# Patient Record
Sex: FEMALE | Race: WHITE | NOT HISPANIC OR LATINO | Employment: FULL TIME | URBAN - METROPOLITAN AREA
[De-identification: names, ages, dates, MRNs, and addresses within clinical notes are randomized per-mention and may not be internally consistent; named-entity substitution may affect disease eponyms.]

---

## 2017-01-13 ENCOUNTER — TELEPHONE (OUTPATIENT)
Dept: NEUROLOGY | Facility: HOSPITAL | Age: 57
End: 2017-01-13

## 2017-01-13 NOTE — TELEPHONE ENCOUNTER
----- Message from Tameka Case sent at 1/12/2017  4:26 PM CST -----  Contact: [phone]  GI- Patient is calling to see if she can get blood work orders to check her Hep C viral load. Please call back to assist.

## 2017-01-13 NOTE — TELEPHONE ENCOUNTER
LVM for pt to inform her that forward information to Dr. Nelson for next step. Awaiting return call back.

## 2017-01-13 NOTE — TELEPHONE ENCOUNTER
Spoke with pt and informed her that her message was forward to Dr. Nelson for review. Pt verbalizes understanding and stated that we leave the message on her machine that the orders are put in so she doesn't have to call back. No other concerns noted.

## 2017-01-17 ENCOUNTER — TELEPHONE (OUTPATIENT)
Dept: NEUROLOGY | Facility: HOSPITAL | Age: 57
End: 2017-01-17

## 2017-01-17 NOTE — TELEPHONE ENCOUNTER
LVM for pt to inform her per Dr. Nelson that pt Hep. C is cured and that there is no need to check it. Awaiting return call back.

## 2017-01-19 ENCOUNTER — TELEPHONE (OUTPATIENT)
Dept: NEUROLOGY | Facility: HOSPITAL | Age: 57
End: 2017-01-19

## 2017-01-19 DIAGNOSIS — B18.2 HEP C W/O COMA, CHRONIC: Primary | ICD-10-CM

## 2017-01-19 NOTE — TELEPHONE ENCOUNTER
----- Message from Leana Islas sent at 1/19/2017 11:23 AM CST -----  Hi Yamilet patient called you can reach her at [phone]  Thanks Leana

## 2017-01-19 NOTE — TELEPHONE ENCOUNTER
Informed pt that her Hep.C is cured and that she doesn't need a lab test to check for it. Pt stated that he insurance is asking for the labs so that she can be able to keep her insurance. Forward information to Dr. Nelson.

## 2017-01-26 ENCOUNTER — LAB VISIT (OUTPATIENT)
Dept: LAB | Facility: HOSPITAL | Age: 57
End: 2017-01-26
Attending: INTERNAL MEDICINE
Payer: COMMERCIAL

## 2017-01-26 DIAGNOSIS — B18.2 HEP C W/O COMA, CHRONIC: ICD-10-CM

## 2017-01-26 PROCEDURE — 87522 HEPATITIS C REVRS TRNSCRPJ: CPT

## 2017-01-26 PROCEDURE — 36415 COLL VENOUS BLD VENIPUNCTURE: CPT | Mod: PO

## 2017-01-30 ENCOUNTER — TELEPHONE (OUTPATIENT)
Dept: NEUROLOGY | Facility: HOSPITAL | Age: 57
End: 2017-01-30

## 2017-01-30 LAB
HCV LOG: <1.08 LOG (10) IU/ML
HCV RNA QUANT PCR: <12 IU/ML
HCV, QUALITATIVE: NOT DETECTED IU/ML

## 2017-01-30 NOTE — TELEPHONE ENCOUNTER
----- Message from Andi Nelson MD sent at 1/30/2017  8:27 AM CST -----  Someone please call this patient to tell her that her hepatitis C level is still negative (she is cured of hepatitis C)

## 2017-01-30 NOTE — TELEPHONE ENCOUNTER
LVM for pt to see where pt would like results from her Hep. C lab sent to. Awaiting return call back.

## 2017-02-06 RX ORDER — ACYCLOVIR 50 MG/G
OINTMENT TOPICAL
Refills: 1 | OUTPATIENT
Start: 2017-02-06

## 2017-02-06 RX ORDER — VALACYCLOVIR HYDROCHLORIDE 1 G/1
1000 TABLET, FILM COATED ORAL
Refills: 3 | OUTPATIENT
Start: 2017-02-06

## 2017-02-07 ENCOUNTER — TELEPHONE (OUTPATIENT)
Dept: NEUROLOGY | Facility: HOSPITAL | Age: 57
End: 2017-02-07

## 2017-02-07 NOTE — TELEPHONE ENCOUNTER
----- Message from Mary Ellen Alfaro sent at 2/7/2017  9:19 AM CST -----  Contact: patient  GI- Patient is returning Yamilet's call. Patient can be reached at [phone]. She will not be available between 10:45 and 11:45.

## 2017-02-09 ENCOUNTER — TELEPHONE (OUTPATIENT)
Dept: NEUROLOGY | Facility: HOSPITAL | Age: 57
End: 2017-02-09

## 2017-02-09 RX ORDER — ACYCLOVIR 50 MG/G
OINTMENT TOPICAL
Qty: 15 G | Refills: 0 | Status: SHIPPED | OUTPATIENT
Start: 2017-02-09 | End: 2017-05-10 | Stop reason: SDUPTHER

## 2017-02-09 RX ORDER — VALACYCLOVIR HYDROCHLORIDE 1 G/1
1000 TABLET, FILM COATED ORAL DAILY PRN
Qty: 30 TABLET | Refills: 0 | Status: SHIPPED | OUTPATIENT
Start: 2017-02-09 | End: 2017-02-12

## 2017-02-09 NOTE — TELEPHONE ENCOUNTER
Pt states she is having an outbreak.  She called Monday because she was in pain and the prescription was denied.  She is upset because she was not notified that is was denies.  She talked to her pharmacy and was told it was denied because she needed an appt.  She said she is very upset and should have been notified.  She said she is so upset she is going to switch doctors.  I apologized for the lack of communication and will look what happened for her.  I offered to make an appt for her she said she will call back for the appt.     She is asking for the 1000mg pills because she cuts them in half.     Dr. Armenta, I spoke with Valeria and Gerson.  Gerson did offer to make an appt with her when she called but pt refused to make the appt, at that time she informed her that the medication may be denied since she does not have a future WWE. Last annual 9/3.  I spoke with Valeria and asked her in the future to please call the patient if she gets any prescription request for a problem.

## 2017-02-09 NOTE — TELEPHONE ENCOUNTER
Spoke with pt and pt stated that she wanted results of her labs emailed to her. Emailed labs to [email or web address]. No other concerns noted.

## 2017-02-09 NOTE — TELEPHONE ENCOUNTER
----- Message from Mary Ellen Alfaro sent at 2/9/2017  8:44 AM CST -----  Contact: Patient  GI- Patient is calling for her test results. Please call patient at [phone].

## 2017-05-11 RX ORDER — ACYCLOVIR 50 MG/G
OINTMENT TOPICAL
Qty: 15 G | Refills: 0 | Status: SHIPPED | OUTPATIENT
Start: 2017-05-11 | End: 2018-02-01

## 2017-09-02 ENCOUNTER — OFFICE VISIT (OUTPATIENT)
Dept: URGENT CARE | Facility: CLINIC | Age: 57
End: 2017-09-02
Payer: COMMERCIAL

## 2017-09-02 VITALS
BODY MASS INDEX: 31.24 KG/M2 | WEIGHT: 183 LBS | DIASTOLIC BLOOD PRESSURE: 78 MMHG | HEIGHT: 64 IN | TEMPERATURE: 100 F | HEART RATE: 78 BPM | OXYGEN SATURATION: 96 % | SYSTOLIC BLOOD PRESSURE: 116 MMHG

## 2017-09-02 DIAGNOSIS — J06.9 UPPER RESPIRATORY TRACT INFECTION, UNSPECIFIED TYPE: Primary | ICD-10-CM

## 2017-09-02 DIAGNOSIS — A09 TRAVELER'S DIARRHEA: ICD-10-CM

## 2017-09-02 PROCEDURE — 3008F BODY MASS INDEX DOCD: CPT | Mod: S$GLB,,, | Performed by: FAMILY MEDICINE

## 2017-09-02 PROCEDURE — 96372 THER/PROPH/DIAG INJ SC/IM: CPT | Mod: S$GLB,,, | Performed by: FAMILY MEDICINE

## 2017-09-02 PROCEDURE — 99214 OFFICE O/P EST MOD 30 MIN: CPT | Mod: 25,S$GLB,, | Performed by: FAMILY MEDICINE

## 2017-09-02 RX ORDER — CIPROFLOXACIN 500 MG/1
500 TABLET ORAL 2 TIMES DAILY
Qty: 6 TABLET | Refills: 0 | Status: SHIPPED | OUTPATIENT
Start: 2017-09-02 | End: 2017-09-05

## 2017-09-02 RX ORDER — BETAMETHASONE SODIUM PHOSPHATE AND BETAMETHASONE ACETATE 3; 3 MG/ML; MG/ML
6 INJECTION, SUSPENSION INTRA-ARTICULAR; INTRALESIONAL; INTRAMUSCULAR; SOFT TISSUE
Status: COMPLETED | OUTPATIENT
Start: 2017-09-02 | End: 2017-09-02

## 2017-09-02 RX ADMIN — BETAMETHASONE SODIUM PHOSPHATE AND BETAMETHASONE ACETATE 6 MG: 3; 3 INJECTION, SUSPENSION INTRA-ARTICULAR; INTRALESIONAL; INTRAMUSCULAR; SOFT TISSUE at 12:09

## 2017-09-02 NOTE — PROGRESS NOTES
"Subjective:       Patient ID: Marcela Shah is a 57 y.o. female.    Vitals:  height is 5' 4" (1.626 m) and weight is 83 kg (183 lb). Her tympanic temperature is 100.1 °F (37.8 °C). Her blood pressure is 116/78 and her pulse is 78. Her oxygen saturation is 96%.     Chief Complaint: Cough    Cough   This is a new problem. The current episode started in the past 7 days. The problem has been gradually worsening. Associated symptoms include a fever (100.8), nasal congestion, postnasal drip and a sore throat. Pertinent negatives include no chest pain, ear pain, eye redness, headaches, myalgias, shortness of breath or wheezing. Nothing aggravates the symptoms. She has tried nothing for the symptoms. The treatment provided no relief.     Patient also complains of diarrhea for 4 days since returning home from [place].    Review of Systems   Constitution: Positive for fever (100.8) and malaise/fatigue.   HENT: Positive for congestion, postnasal drip and sore throat. Negative for ear pain and hoarse voice.    Eyes: Negative for discharge and redness.   Cardiovascular: Negative for chest pain, dyspnea on exertion and leg swelling.   Respiratory: Negative for cough, shortness of breath, sputum production and wheezing.    Musculoskeletal: Negative for myalgias.   Gastrointestinal: Positive for diarrhea. Negative for abdominal pain, nausea and vomiting.   Neurological: Negative for headaches.       Objective:      Physical Exam   Constitutional: She appears well-developed and well-nourished. She is cooperative.  Non-toxic appearance. She does not appear ill. No distress.   HENT:   Head: Normocephalic and atraumatic.   Right Ear: Hearing, tympanic membrane, external ear and ear canal normal.   Left Ear: Hearing, tympanic membrane, external ear and ear canal normal.   Nose: Mucosal edema present. No rhinorrhea or nasal deformity. No epistaxis. Right sinus exhibits no maxillary sinus tenderness and no frontal sinus tenderness. " Left sinus exhibits no maxillary sinus tenderness and no frontal sinus tenderness.   Mouth/Throat: Uvula is midline, oropharynx is clear and moist and mucous membranes are normal. No trismus in the jaw. Normal dentition. No uvula swelling. No posterior oropharyngeal erythema.   Eyes: Conjunctivae and lids are normal. No scleral icterus.   Sclera clear bilat   Neck: Trachea normal, full passive range of motion without pain and phonation normal. Neck supple.   Cardiovascular: Normal rate, regular rhythm, normal heart sounds, intact distal pulses and normal pulses.    Pulmonary/Chest: Effort normal and breath sounds normal. No respiratory distress.   Abdominal: Soft. Normal appearance. She exhibits no distension. Bowel sounds are increased. There is no tenderness.   Musculoskeletal: Normal range of motion.   Neurological: She is alert.   Skin: Skin is warm, dry and intact. She is not diaphoretic.   Psychiatric: She has a normal mood and affect. Her speech is normal and behavior is normal. Cognition and memory are normal.   Nursing note and vitals reviewed.      Assessment:       1. Upper respiratory tract infection, unspecified type    2. Traveler's diarrhea        Plan:         Upper respiratory tract infection, unspecified type  -     betamethasone acetate-betamethasone sodium phosphate injection 6 mg; Inject 1 mL (6 mg total) into the muscle one time.  -     Allegra PRN    Traveler's diarrhea  -     ciprofloxacin HCl (CIPRO) 500 MG tablet; Take 1 tablet (500 mg total) by mouth 2 (two) times daily.  Dispense: 6 tablet; Refill: 0  -     Probiotics  -     Maintain hydration

## 2017-09-02 NOTE — PATIENT INSTRUCTIONS
"-Allegra 180mg daily as needed    -Probiotics    -Maintain hydration    "BRAT" diet (bananas, Rice, Applesauce, and Toast)  "

## 2018-11-20 ENCOUNTER — TELEPHONE (OUTPATIENT)
Dept: OBSTETRICS AND GYNECOLOGY | Facility: CLINIC | Age: 58
End: 2018-11-20

## 2018-11-20 NOTE — TELEPHONE ENCOUNTER
----- Message from Alberto Armenta MD sent at 11/13/2018  7:57 PM CST -----  Please call   BMD results are in!  You do NOT have osteoporosis!  Hip and spine shows just mild osteopenia only.   Rec Vit D 1000-2000IU daily and we will repeat another in 2-3 years.  Dr Armenta

## 2018-11-26 ENCOUNTER — TELEPHONE (OUTPATIENT)
Dept: OBSTETRICS AND GYNECOLOGY | Facility: CLINIC | Age: 58
End: 2018-11-26

## 2018-12-06 ENCOUNTER — TELEPHONE (OUTPATIENT)
Dept: OBSTETRICS AND GYNECOLOGY | Facility: CLINIC | Age: 58
End: 2018-12-06

## 2018-12-07 ENCOUNTER — TELEPHONE (OUTPATIENT)
Dept: OBSTETRICS AND GYNECOLOGY | Facility: CLINIC | Age: 58
End: 2018-12-07

## 2023-06-13 ENCOUNTER — TELEPHONE (OUTPATIENT)
Dept: INFECTIOUS DISEASES | Facility: CLINIC | Age: 63
End: 2023-06-13
Payer: COMMERCIAL

## 2023-06-14 ENCOUNTER — CLINICAL SUPPORT (OUTPATIENT)
Dept: INFECTIOUS DISEASES | Facility: CLINIC | Age: 63
End: 2023-06-14
Payer: COMMERCIAL

## 2023-06-14 ENCOUNTER — OFFICE VISIT (OUTPATIENT)
Dept: INFECTIOUS DISEASES | Facility: CLINIC | Age: 63
End: 2023-06-14

## 2023-06-14 VITALS
BODY MASS INDEX: 32.45 KG/M2 | WEIGHT: 190.06 LBS | HEART RATE: 76 BPM | TEMPERATURE: 98 F | SYSTOLIC BLOOD PRESSURE: 113 MMHG | HEIGHT: 64 IN | DIASTOLIC BLOOD PRESSURE: 73 MMHG

## 2023-06-14 DIAGNOSIS — Z71.84 TRAVEL ADVICE ENCOUNTER: Primary | ICD-10-CM

## 2023-06-14 DIAGNOSIS — Z71.84 TRAVEL ADVICE ENCOUNTER: ICD-10-CM

## 2023-06-14 PROCEDURE — 99402 PR PREVENT COUNSEL,INDIV,30 MIN: ICD-10-PCS | Mod: S$GLB,,, | Performed by: INTERNAL MEDICINE

## 2023-06-14 PROCEDURE — 90471 IMMUNIZATION ADMIN: CPT | Mod: S$GLB,,, | Performed by: INTERNAL MEDICINE

## 2023-06-14 PROCEDURE — 99999 PR PBB SHADOW E&M-EST. PATIENT-LVL III: ICD-10-PCS | Mod: PBBFAC,,, | Performed by: INTERNAL MEDICINE

## 2023-06-14 PROCEDURE — 99999 PR PBB SHADOW E&M-EST. PATIENT-LVL I: CPT | Mod: PBBFAC,,,

## 2023-06-14 PROCEDURE — 90691 TYPHOID VACCINE IM: CPT | Mod: S$GLB,,, | Performed by: INTERNAL MEDICINE

## 2023-06-14 PROCEDURE — 99999 PR PBB SHADOW E&M-EST. PATIENT-LVL III: CPT | Mod: PBBFAC,,, | Performed by: INTERNAL MEDICINE

## 2023-06-14 PROCEDURE — 99402 PREV MED CNSL INDIV APPRX 30: CPT | Mod: S$GLB,,, | Performed by: INTERNAL MEDICINE

## 2023-06-14 PROCEDURE — 90691 TYPHOID VICPS VACCINE IM: ICD-10-PCS | Mod: S$GLB,,, | Performed by: INTERNAL MEDICINE

## 2023-06-14 PROCEDURE — 90471 TYPHOID VICPS VACCINE IM: ICD-10-PCS | Mod: S$GLB,,, | Performed by: INTERNAL MEDICINE

## 2023-06-14 PROCEDURE — 99999 PR PBB SHADOW E&M-EST. PATIENT-LVL I: ICD-10-PCS | Mod: PBBFAC,,,

## 2023-06-14 RX ORDER — CIPROFLOXACIN 500 MG/1
500 TABLET ORAL 2 TIMES DAILY
Qty: 6 TABLET | Refills: 0 | Status: SHIPPED | OUTPATIENT
Start: 2023-06-14 | End: 2023-06-17

## 2023-06-14 RX ORDER — ATOVAQUONE AND PROGUANIL HYDROCHLORIDE 250; 100 MG/1; MG/1
TABLET, FILM COATED ORAL
Qty: 26 TABLET | Refills: 0 | Status: SHIPPED | OUTPATIENT
Start: 2023-06-14

## 2023-06-14 NOTE — PATIENT INSTRUCTIONS
Before you travel:  Prepare a travel health kit- any over the counter medications you may need including but not limited to:  First Aid Kit  Diarrhea medicine (Imodium or Pepto-Bismol)  Antacid (use as needed)  Antihistamine  Motion sickness medicine  Cough drops, cough suppressant, or expectorant  Decongestant  Pain and fever medicine (acetaminophen, aspirin, or ibuprofen)  2. Take insect repellent with DEET or Picaridin.  3. Take sunscreen SPF 15 or higher and use according to label instructions.  4. Take aloe products in case you develop sunburns.  5. Take alcohol-based hand sanitizers.  6. Take water disinfection tablets.    During your travel:  Avoid insect bites by:  Using insect repellent with DEET or Picaridin  Wear long sleeved shirts and long pants  Treat clothing and gear with permethrin 0.5%  Choose a hotel or lodging with air conditioning, or windows and door screens  Sleep under a mosquito net if you cannot stay in a place with air conditioning, or without screens and if you are staying outdoors  Prevent tick bites by:  Avoiding grassy or wooded areas or animals  Treat clothing and gear with permethrin 0/5%  Use EPA registered insect repellents  Avoid contact with ticks  3. Perform tick checks and remove them if found    After you travel:  Ensure you finish any remaining medication such as antimalarial medications.   If you are not feeling well, please contact the office immediately to evaluate your symptoms.   You may be asked to remember any travel activities, where you stayed (hotel, family home, etc), what your ate and drank, any animal contacts, injuries or bug bites.

## 2023-06-14 NOTE — PROGRESS NOTES
Infectious Disease Clinic  [facility]  Subjective:      Patient ID:Magaly Shah is a 62 y.o. female       Chief Complaint:   Chief Complaint   Patient presents with    Travel Consult       History of Present Illness    A 62 y.o. female patient who is seen for travel advice encounter. The patient will be travelling to [facility] and [facility]. Further travel history as detailed below:    Destinations on Itinerary: [facility], [facility]  Duration of trip: 17 days  Type of Accommodations: Hotels, Tented Camps (AC)  Planned Activities: Tours    Review of Systems   Constitutional: Negative for chills, decreased appetite, fever, malaise/fatigue, night sweats, weight gain and weight loss.   HENT:  Negative for congestion, ear pain, hearing loss, hoarse voice, sore throat and tinnitus.    Eyes:  Negative for blurred vision, redness and visual disturbance.   Cardiovascular:  Negative for chest pain, leg swelling and palpitations.   Respiratory:  Negative for cough, hemoptysis, shortness of breath, sputum production and wheezing.    Hematologic/Lymphatic: Negative for adenopathy. Does not bruise/bleed easily.   Skin:  Negative for dry skin, itching, rash and suspicious lesions.   Musculoskeletal:  Positive for joint pain and myalgias. Negative for back pain and neck pain.   Gastrointestinal:  Negative for abdominal pain, constipation, diarrhea, heartburn, nausea and vomiting.   Genitourinary:  Negative for dysuria, flank pain, frequency, hematuria, hesitancy and urgency.   Neurological:  Negative for dizziness, headaches, numbness, paresthesias and weakness.   Psychiatric/Behavioral:  Negative for depression and memory loss. The patient does not have insomnia and is not nervous/anxious.      Objective:     Physical Exam  Vitals and nursing note reviewed.   Constitutional:       Appearance: She is well-developed.   HENT:      Head: Normocephalic and atraumatic.   Eyes:      General: No scleral icterus.        Right  eye: No discharge.         Left eye: No discharge.      Conjunctiva/sclera: Conjunctivae normal.   Pulmonary:      Effort: Pulmonary effort is normal.   Musculoskeletal:         General: Normal range of motion.   Skin:     General: Skin is warm and dry.   Neurological:      Mental Status: She is alert and oriented to person, place, and time.   Psychiatric:         Behavior: Behavior normal.         Thought Content: Thought content normal.         Judgment: Judgment normal.       Immunization History   Administered Date(s) Administered    Typhoid - ViCPs 06/14/2023       Assessment:       ICD-10-CM ICD-9-CM   1. Travel advice encounter  Z71.84 V65.49       Plan:   The Patient was provided with an extensive travel guidance packet which provides travel information specific to the patients itinerary.     The patient's medical history was reviewed and the patient was counseled on:   -Dietary precautions.   -Personal protective measures to prevent insect-borne diseases (e.g., malaria, dengue).   -Precautions to prevent exposure to rabies and seek treatment for possible exposures.   -Precautions against sun exposure.   -Precautions against development of DVT during flight.   -Personal and travel safety.    The patient's immunization history was reviewed and, based on the patient's itinerary, immunizations below were ordered. The patient was encouraged to contact us about any problems that may develop after immunization and possible side effects were reviewed. The patient was instructed to purchase Imodium over the counter to take in case diarrhea (without blood or fever) develops. An antibiotic, as detailed below, was ordered for treatment if severe or bloody diarrhea develops and the patient was instructed on use and possible side effects.     Additionally, if indicated by the patients itinerary an anti-malarial agent was prescribed for malaria prophylaxis and possible side effects were reviewed. The patient was also  instructed to purchase insect repellent containing DEET or [place] and apply according to repellent label instructions.      Orders Placed This Encounter    Typhoid Vaccine (ViCPs) (IM)    atovaquone-proguaniL (MALARONE) 250-100 mg Tab    ciprofloxacin HCl (CIPRO) 500 MG tablet   Yellow fever exemption letter provided.     The patient was instructed to contact us if problems develop after travel.    The total time for evaluation and management services performed on 6/14/23 was greater than 30 minutes

## 2025-08-04 ENCOUNTER — TELEPHONE (OUTPATIENT)
Dept: INFECTIOUS DISEASES | Facility: CLINIC | Age: 65
End: 2025-08-04
Payer: COMMERCIAL

## 2025-08-04 NOTE — TELEPHONE ENCOUNTER
Copied from CRM #[id number]. Topic: General Inquiry - Patient Advice  >> Aug 4, 2025 10:18 AM Keya wrote:  Type: Yellow Fever Christi Needed    Who Called:Marcela Shah      Would the patient rather a call back or a response via [facility]? Call back    Best Call Back Number: [phone]    Additional Information:Patient needs copy of yellow fever wavier due to misplacing paper. Pt leaves tomorrow.      Called patient and she asked if we can reprint her yellow fever exemption letter. Can you sign her paper since Dr. Davis is out of the office?